# Patient Record
Sex: MALE | NOT HISPANIC OR LATINO | URBAN - METROPOLITAN AREA
[De-identification: names, ages, dates, MRNs, and addresses within clinical notes are randomized per-mention and may not be internally consistent; named-entity substitution may affect disease eponyms.]

---

## 2019-05-08 ENCOUNTER — OFFICE VISIT (OUTPATIENT)
Dept: URBAN - METROPOLITAN AREA CLINIC 18 | Facility: CLINIC | Age: 69
End: 2019-05-08
Payer: COMMERCIAL

## 2019-05-08 PROCEDURE — 92014 COMPRE OPH EXAM EST PT 1/>: CPT | Performed by: OPTOMETRIST

## 2019-05-08 ASSESSMENT — INTRAOCULAR PRESSURE
OS: 11
OD: 12

## 2019-05-08 ASSESSMENT — KERATOMETRY
OS: 41.50
OD: 41.50

## 2019-05-08 NOTE — IMPRESSION/PLAN
Impression: Crystalline deposits in vitreous body, unspecified eye: H43.20. OU. Plan: Discussed diagnosis in detail with patient. No treatment is required at this time. Will continue to observe condition and or symptoms. Patient instructed to call if condition gets worse.

## 2019-05-08 NOTE — IMPRESSION/PLAN
Impression: Type 2 diabetes mellitus w/o complication: P78.7. Plan: Diabetes type II: no background retinopathy, no signs of neovascularization noted. Discussed ocular and systemic benefits of blood sugar control.

## 2019-05-08 NOTE — IMPRESSION/PLAN
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 Plan: Discussed diagnosis in detail with patient. No treatment is required at this time. Will continue to observe condition and or symptoms. Patient instructed to call if condition gets worse.

## 2020-08-04 ENCOUNTER — OFFICE VISIT (OUTPATIENT)
Dept: URBAN - METROPOLITAN AREA CLINIC 18 | Facility: CLINIC | Age: 70
End: 2020-08-04
Payer: MEDICARE

## 2020-08-04 DIAGNOSIS — E11.9 TYPE 2 DIABETES MELLITUS W/O COMPLICATION: Primary | ICD-10-CM

## 2020-08-04 DIAGNOSIS — H25.813 COMBINED FORMS OF AGE-RELATED CATARACT, BILATERAL: ICD-10-CM

## 2020-08-04 DIAGNOSIS — H43.20 CRYSTALLINE DEPOSITS IN VITREOUS BODY, UNSPECIFIED EYE: ICD-10-CM

## 2020-08-04 PROCEDURE — 92014 COMPRE OPH EXAM EST PT 1/>: CPT | Performed by: OPTOMETRIST

## 2020-08-04 ASSESSMENT — INTRAOCULAR PRESSURE
OD: 14
OS: 16

## 2020-08-04 ASSESSMENT — KERATOMETRY
OS: 41.88
OD: 41.38

## 2020-08-04 NOTE — IMPRESSION/PLAN
Impression: Crystalline deposits in vitreous body, unspecified eye: H43.20 OU. Plan: Condition is stable, no further treatment at this time. Monitor.

## 2020-08-04 NOTE — IMPRESSION/PLAN
Impression: Combined forms of age-related cataract, bilateral: H25.813. OU. Plan: Discussed cataract diagnosis with the patient. Discussed and reviewed treatment options for cataracts. Risks and benefits of surgical treatment were discussed and understood. Pt interested in surgical treatment. Refer for consult w/Dr. Viviana Ruggiero, next available.

## 2020-08-04 NOTE — IMPRESSION/PLAN
Impression: Type 2 diabetes mellitus w/o complication: W50.2. OU. Plan: No Non-Proliferative Diabetic Retinopathy, no Diabetic Macular Edema and no Neovascularization of the iris, disc, or elsewhere. Discussed ocular and systemic benefits of blood sugar control. Send notes to PCP. Check annually. RTC 1 year x CEE.

## 2020-08-17 ENCOUNTER — OFFICE VISIT (OUTPATIENT)
Dept: URBAN - METROPOLITAN AREA CLINIC 17 | Facility: CLINIC | Age: 70
End: 2020-08-17
Payer: MEDICARE

## 2020-08-17 DIAGNOSIS — H25.11 AGE-RELATED NUCLEAR CATARACT, RIGHT EYE: ICD-10-CM

## 2020-08-17 PROCEDURE — 92004 COMPRE OPH EXAM NEW PT 1/>: CPT | Performed by: OPHTHALMOLOGY

## 2020-08-17 ASSESSMENT — VISUAL ACUITY
OD: 20/40
OS: 20/50

## 2020-08-17 ASSESSMENT — KERATOMETRY
OS: 41.88
OD: 42.25

## 2020-08-17 ASSESSMENT — INTRAOCULAR PRESSURE
OS: 15
OD: 15

## 2020-08-17 NOTE — IMPRESSION/PLAN
Impression: Combined forms of age-related cataract, left eye: H25.812. Condition: established, worsening. Symptoms: could improve with surgery. Vision: vision affected. Plan: Cataract accounts for patient's complaints. Reviewed risks, benefits, and procedure. Patient desires surgery, schedule ce/iol OS then OD, RL2, discussed lens options, distance refractive target, patient is clear for surgery in [place] [address].

## 2020-08-17 NOTE — IMPRESSION/PLAN
Impression: Age-related nuclear cataract, right eye: H25.11. Condition: established, worsening. Symptoms: could improve with surgery. Vision: vision affected.  Plan: as above

## 2020-08-17 NOTE — IMPRESSION/PLAN
Impression: Crystalline deposits in vitreous body, bilateral: H43.23. Condition: established, stable. Plan: Discussed diagnosis in detail with patient. Discussed treatment options with patient. No treatment is required at this time. Will continue to observe condition and or symptoms.

## 2020-08-26 ENCOUNTER — PRE-OPERATIVE VISIT (OUTPATIENT)
Dept: URBAN - METROPOLITAN AREA CLINIC 17 | Facility: CLINIC | Age: 70
End: 2020-08-26
Payer: MEDICARE

## 2020-08-26 DIAGNOSIS — H25.812 COMBINED FORMS OF AGE-RELATED CATARACT, LEFT EYE: Primary | ICD-10-CM

## 2020-08-26 PROCEDURE — 92136 OPHTHALMIC BIOMETRY: CPT | Performed by: OPHTHALMOLOGY

## 2020-08-26 RX ORDER — PREDNISOLONE ACETATE 10 MG/ML
1 % SUSPENSION/ DROPS OPHTHALMIC
Qty: 10 | Refills: 1 | Status: INACTIVE
Start: 2020-08-26 | End: 2020-11-07

## 2020-08-26 RX ORDER — OFLOXACIN 3 MG/ML
0.3 % SOLUTION/ DROPS OPHTHALMIC
Qty: 5 | Refills: 1 | Status: INACTIVE
Start: 2020-08-26 | End: 2020-11-07

## 2020-08-26 ASSESSMENT — PACHYMETRY
OD: 2.55
OD: 23.72
OS: 2.37
OS: 24.06

## 2020-09-08 ENCOUNTER — SURGERY (OUTPATIENT)
Dept: URBAN - METROPOLITAN AREA SURGERY 7 | Facility: SURGERY | Age: 70
End: 2020-09-08
Payer: MEDICARE

## 2020-09-08 PROCEDURE — 66984 XCAPSL CTRC RMVL W/O ECP: CPT | Performed by: OPHTHALMOLOGY

## 2020-09-09 ENCOUNTER — POST-OPERATIVE VISIT (OUTPATIENT)
Dept: URBAN - METROPOLITAN AREA CLINIC 17 | Facility: CLINIC | Age: 70
End: 2020-09-09

## 2020-09-09 PROCEDURE — 99024 POSTOP FOLLOW-UP VISIT: CPT | Performed by: OPTOMETRIST

## 2020-09-09 ASSESSMENT — INTRAOCULAR PRESSURE
OS: 23
OD: 12

## 2020-09-09 NOTE — IMPRESSION/PLAN
Impression: S/P Cat ExtraCap W/ IOL [id number] +22.0 OS - 1 Day. Encounter for surgical aftercare following surgery on a sense organ  Z48.810. Post operative instructions reviewed - No bending below the waist Plan: Return in 1 week for PO2 
--Continue Ofloxacin 0.3% QID OS x 1 week then d/c
--Taper Prednisolone acetate 1% QID OS x 2 wk, TID OS x 1 wk, BID OS x 1wk, QD OS x 1wk, then d/c

## 2020-09-14 ENCOUNTER — POST-OPERATIVE VISIT (OUTPATIENT)
Dept: URBAN - METROPOLITAN AREA CLINIC 17 | Facility: CLINIC | Age: 70
End: 2020-09-14
Payer: MEDICARE

## 2020-09-14 PROCEDURE — 99024 POSTOP FOLLOW-UP VISIT: CPT | Performed by: OPTOMETRIST

## 2020-09-14 ASSESSMENT — INTRAOCULAR PRESSURE
OS: 14
OD: 26

## 2020-09-14 NOTE — IMPRESSION/PLAN
Impression: S/P Cat ExtraCap W/ IOL SA60WF +22.0 OS - 6 Days. Encounter for surgical aftercare following surgery on a sense organ  Z48.810. Condition is improving - Plan: Patient may proceed with CE IOL OD as ordered OS:
--Continue Ofloxacin QID OS x 2 days then d/c
--Taper Prednisolone acetate 1% QID x 1 wk, TID x 1 wk, BID x 1wk, QD x 1wk, then d/c Emphasized gtts compliance

## 2020-09-21 ENCOUNTER — SURGERY (OUTPATIENT)
Dept: URBAN - METROPOLITAN AREA SURGERY 7 | Facility: SURGERY | Age: 70
End: 2020-09-21
Payer: MEDICARE

## 2020-09-21 PROCEDURE — 66984 XCAPSL CTRC RMVL W/O ECP: CPT | Performed by: OPHTHALMOLOGY

## 2020-09-22 ENCOUNTER — POST-OPERATIVE VISIT (OUTPATIENT)
Dept: URBAN - METROPOLITAN AREA CLINIC 17 | Facility: CLINIC | Age: 70
End: 2020-09-22
Payer: MEDICARE

## 2020-09-22 PROCEDURE — 99024 POSTOP FOLLOW-UP VISIT: CPT | Performed by: OPTOMETRIST

## 2020-09-22 ASSESSMENT — INTRAOCULAR PRESSURE
OS: 12
OD: 22

## 2020-09-22 NOTE — IMPRESSION/PLAN
Impression: S/P Cat ExtraCap W/ IOL SA60WF +22.50 OD - 1 Day. Encounter for surgical aftercare following surgery on a sense organ  Z48.810. Excellent post op course Plan: --Taper Prednisolone acetate 1% QID OD x 2 wk, TID x 1 wk, BID x 1wk, QD x 1wk, then d/c

--Continue Ofloxacin 0.3% QID OD Continue Pred ace TID OS and taper as directed

## 2020-09-28 ENCOUNTER — POST-OPERATIVE VISIT (OUTPATIENT)
Dept: URBAN - METROPOLITAN AREA CLINIC 17 | Facility: CLINIC | Age: 70
End: 2020-09-28
Payer: MEDICARE

## 2020-09-28 PROCEDURE — 99024 POSTOP FOLLOW-UP VISIT: CPT | Performed by: OPTOMETRIST

## 2020-09-28 ASSESSMENT — INTRAOCULAR PRESSURE
OD: 18
OS: 11

## 2020-09-28 NOTE — IMPRESSION/PLAN
Impression: S/P Cat ExtraCap W/ IOL SA60WF +22.50 OD - 7 Days. Encounter for surgical aftercare following surgery on a sense organ  Z48.810.  Excellent post op course Plan: --Taper Prednisolone acetate 1% QID x 1 wk, TID x 1 wk, BID x 1wk, QD x 1wk, then d/c
--Discontinue Ofloxacin 0.3%

## 2020-10-20 ENCOUNTER — POST-OPERATIVE VISIT (OUTPATIENT)
Dept: URBAN - METROPOLITAN AREA CLINIC 17 | Facility: CLINIC | Age: 70
End: 2020-10-20
Payer: MEDICARE

## 2020-10-20 DIAGNOSIS — Z48.810 ENCOUNTER FOR SURGICAL AFTERCARE FOLLOWING SURGERY ON A SENSE ORGAN: Primary | ICD-10-CM

## 2020-10-20 PROCEDURE — 99024 POSTOP FOLLOW-UP VISIT: CPT | Performed by: OPTOMETRIST

## 2020-10-20 ASSESSMENT — INTRAOCULAR PRESSURE
OD: 17
OS: 8

## 2020-10-20 ASSESSMENT — VISUAL ACUITY: OD: 20/40

## 2020-10-20 NOTE — IMPRESSION/PLAN
Impression: S/P Cat ExtraCap W/ IOL SA60WF +22.50 OD - 29 Days. Encounter for surgical aftercare following surgery on a sense organ  Z48.810. Plan: --Continue Prednisolone acetate 1% QD x 3 days then D/c
--Advised patient to use artificial tears for comfort. Sample given today of Systane ultra.

## 2020-11-07 ENCOUNTER — OFFICE VISIT (OUTPATIENT)
Dept: URBAN - METROPOLITAN AREA CLINIC 18 | Facility: CLINIC | Age: 70
End: 2020-11-07
Payer: MEDICARE

## 2020-11-07 DIAGNOSIS — Z96.1 PRESENCE OF INTRAOCULAR LENS: ICD-10-CM

## 2020-11-07 DIAGNOSIS — E11.3413 TYPE 2 DIAB W SEVERE NONPRLF DIABETIC RTNOP W MACULAR EDEMA, BILATERAL: Primary | ICD-10-CM

## 2020-11-07 PROCEDURE — 92134 CPTRZ OPH DX IMG PST SGM RTA: CPT | Performed by: OPTOMETRIST

## 2020-11-07 PROCEDURE — 99214 OFFICE O/P EST MOD 30 MIN: CPT | Performed by: OPTOMETRIST

## 2020-11-07 ASSESSMENT — INTRAOCULAR PRESSURE
OS: 10
OD: 20

## 2020-11-07 NOTE — IMPRESSION/PLAN
Impression: Crystalline deposits in vitreous body, bilateral: H43.23. Bilateral. Plan: dense asteroid hyalosis. Consider vitrectomy - refer for consult w/retina.

## 2020-11-07 NOTE — IMPRESSION/PLAN
Impression: Type 2 diab w severe nonprlf diabetic rtnop w macular edema, bilateral: W33.90. Bilateral.
severe macular edema OU
severe inflammation of eye OUD>OS
pt experiencing significant pain OU Plan: Pt ed re: condition. OCT performed and reviewed. Begin Durezol TID OU. Refer for consult w/retina specialist, 1-4 days.

## 2020-11-09 ENCOUNTER — OFFICE VISIT (OUTPATIENT)
Dept: URBAN - METROPOLITAN AREA CLINIC 17 | Facility: CLINIC | Age: 70
End: 2020-11-09
Payer: MEDICARE

## 2020-11-09 DIAGNOSIS — H43.313 VITREOUS MEMBRANES AND STRANDS, BILATERAL: ICD-10-CM

## 2020-11-09 DIAGNOSIS — H43.23 CRYSTALLINE DEPOSITS IN VITREOUS BODY, BILATERAL: Primary | ICD-10-CM

## 2020-11-09 DIAGNOSIS — H43.393 OTHER VITREOUS OPACITIES, BILATERAL: ICD-10-CM

## 2020-11-09 DIAGNOSIS — E11.3313 TYPE 2 DIAB W MODERATE NONPRLF DIAB RTNOP W MACULAR EDEMA, BILATERAL: ICD-10-CM

## 2020-11-09 PROCEDURE — 92014 COMPRE OPH EXAM EST PT 1/>: CPT | Performed by: OPHTHALMOLOGY

## 2020-11-09 PROCEDURE — 92134 CPTRZ OPH DX IMG PST SGM RTA: CPT | Performed by: OPHTHALMOLOGY

## 2020-11-09 RX ORDER — BROMFENAC SODIUM 0.7 MG/ML
0.07 % SOLUTION/ DROPS OPHTHALMIC
Qty: 3 | Refills: 5 | Status: ACTIVE
Start: 2020-11-09

## 2020-11-09 RX ORDER — OFLOXACIN 3 MG/ML
0.3 % SOLUTION/ DROPS OPHTHALMIC
Qty: 5 | Refills: 3 | Status: ACTIVE
Start: 2020-11-09

## 2020-11-09 RX ORDER — PREDNISOLONE ACETATE 10 MG/ML
1 % SUSPENSION/ DROPS OPHTHALMIC
Qty: 10 | Refills: 5 | Status: ACTIVE
Start: 2020-11-09

## 2020-11-09 ASSESSMENT — INTRAOCULAR PRESSURE
OD: 13
OS: 12

## 2020-11-09 NOTE — IMPRESSION/PLAN
Impression: Vitreous membranes and strands, bilateral: H43.313. Bilateral. Condition: quality of life issue. Vision: vision affected. Plan: Discussed diagnosis in detail with patient. Discussed risks of progression. Recommend surgery OD to remove the floaters and treat the swelling for possible vision improvement - see notes above.  (will consider sx OS in the future)

## 2020-11-09 NOTE — IMPRESSION/PLAN
Impression: Other vitreous opacities, bilateral: H43.393. Bilateral. Condition: quality of life issue. Vision: vision affected. Plan: Discussed diagnosis in detail with patient. Discussed risks of progression. Recommend surgery OD to remove the floaters and treat the swelling for possible vision improvement - see notes above. (will consider sx OS in the future once the right eye is stable).

## 2020-11-09 NOTE — IMPRESSION/PLAN
Impression: Crystalline deposits in vitreous body, bilateral: H43.23 Bilateral. Condition: quality of life issue. Vision: vision affected. Plan: Discussed diagnosis in detail with patient. No treatment is required at this time. Patient states floaters are very bothersome, vision is affected and interferes with daily activities. Surgical treatment is recommended based on patient's complaints PPVx RIGHT EYE to remove the floaters for possible vision improvement and treat the swelling. Surgical risks and benefits were discussed, explained and understood by patient. All questions answered. Patient elects to proceed with recommendation. RL1. Educational material provided to patient. (will consider sx OS in the future once the right eye is stable). Erx Prednisolone and Ofloxacin to patient's pharmacy.  (continue using Durezol OU TID as recommended by Dr. Tao Avendaño)

## 2020-11-09 NOTE — IMPRESSION/PLAN
Impression: Type 2 diab w moderate nonprlf diab rtnop w macular edema, bilateral: N59.3590. Bilateral. Condition: unstable. Vision: vision affected. Plan: Discussed diagnosis in detail with patient. Discussed risks of progression. Recommend surgery OD to remove the floaters and treat the swelling for possible vision improvement - see notes above. (will consider sx OS in the future once the right eye is stable). Start Prolensa OU QD to further reduce the swelling OU.

## 2021-01-19 ENCOUNTER — OFFICE VISIT (OUTPATIENT)
Dept: URBAN - METROPOLITAN AREA CLINIC 17 | Facility: CLINIC | Age: 71
End: 2021-01-19
Payer: OTHER MISCELLANEOUS

## 2021-01-19 DIAGNOSIS — H26.491 OTHER SECONDARY CATARACT OF RIGHT EYE: ICD-10-CM

## 2021-01-19 DIAGNOSIS — H04.123 DRY EYE SYNDROME OF BILATERAL LACRIMAL GLANDS: ICD-10-CM

## 2021-01-19 PROCEDURE — 99213 OFFICE O/P EST LOW 20 MIN: CPT | Performed by: OPTOMETRIST

## 2021-01-19 ASSESSMENT — INTRAOCULAR PRESSURE
OS: 13
OD: 14

## 2021-01-19 NOTE — IMPRESSION/PLAN
Impression: Other secondary cataract of right eye: H26.491. Plan: Discussed diagnosis in detail with patient. No treatment is required at this time. Will continue to observe condition and or symptoms. Patient instructed to call if condition gets worse.  Order Refraction per patient request.

## 2021-01-19 NOTE — IMPRESSION/PLAN
Impression: Type 2 diabetes mellitus w/o complication: S99.0. Plan: No diabetic retinopathy today. Discussed importance of closely monitoring and controlling glucose to minimize risks of progression of disease. Patient instructed to notify clinic immediately if changes to vision are noted.